# Patient Record
Sex: FEMALE | Race: WHITE | NOT HISPANIC OR LATINO | Employment: FULL TIME | URBAN - METROPOLITAN AREA
[De-identification: names, ages, dates, MRNs, and addresses within clinical notes are randomized per-mention and may not be internally consistent; named-entity substitution may affect disease eponyms.]

---

## 2018-07-27 ENCOUNTER — OFFICE VISIT (OUTPATIENT)
Dept: AUDIOLOGY | Facility: CLINIC | Age: 38
End: 2018-07-27
Payer: OTHER GOVERNMENT

## 2018-07-27 DIAGNOSIS — H90.6 MIXED HEARING LOSS, BILATERAL: Primary | ICD-10-CM

## 2018-07-27 NOTE — PROGRESS NOTES
"AUDIOLOGY REPORT    SUBJECTIVE:  Lisa Alejandro is a 38 year old female who was seen in the Audiology Clinic at the [facility], [facility] and [facility] for audiologic evaluation and BAHA follow-up, referred by her primary care physician Dr. De Dios.  The patient has been seen previously at an outside clinic in December 2011 for assessment and results indicated a bilateral mixed hearing loss. Lisa reports a long history of middle ear surgeries, she reports history of PE tubes, tympanoplasties, and other ear surgeries where she was not sure exactly what was done.  She was implanted with binaural bone anchored hearing aids from [employer] in 2012. The patient reports that her hearing has decreased bilaterally and that she has not had a hearing evaluation since 2011. The patient denies bilateral tinnitus, bilateral otalgia, bilateral drainage, bilateral aural fullness, and dizziness.  The patient notes difficulty with communication in a variety of listening situations without her devices.  She also reports that the right device seems to be \"acting strangely\" since it got knocked off when camping.     OBJECTIVE:  Fall Risk Screen:  1. Have you fallen two or more times in the past year? No  2. Have you fallen and had an injury in the past year? No    Timed Up and Go Score (in seconds): not tested  Is patient a fall risk? No  Referral initiated: No  Fall Risk Assessment Completed by Audiology    Otoscopic exam indicates abnormal anatomy bilaterally.    Pure Tone Thresholds assessed using conventional audiometry with good  reliability from 250-8000 Hz bilaterally using insert earphones and circumaural headphones     RIGHT:  Profound rising to moderately-severe mixed hearing loss    LEFT:    Profound rising to moderately-severe mixed hearing loss  Note: Masking dilemmas and possible overmasking issues for bone conduction thresholds at multiple frequencies.  Could not mask air-conduction thresholds " 250-500 Hz bilaterally due to limits of equipment.    Tympanogram:  Did not test due to unknown surgical history    Reflexes (reported by stimulus ear):  Did not test due to unknown surgical history    Speech Reception Threshold:    RIGHT: 85 dB HL    LEFT:   85 dB HL    Word Recognition Score:     RIGHT: 44% at 100 dB HL using NU-6 recorded word list.    LEFT:   52% at 100 dB HL using NU-6 recorded word list.  Could not test at louder sensation level due to limits of equipment.    Both devices were connected to the computer. In-situ bone conduction was performed and thresholds had slightly changed, the gain prescription was changed based on today's in-situ results.  Patient reported better sound quality with the gain increased.  She reported that the left device was louder than the right device so overall gain in the left device was decreased 5 dB.  Following the decrease in gain the patient reported better balance between the ears.    The right device was cleaned, a listening check did not reveal any distortion.  Lisa reported that the sound quality has been improving since the device got knocked to the ground, she previously had issues a 6-7 times a day with sound quality but know is only having to adjust it once or twice a day.  It was discussed that the hearing aid may have moisture damage if the issue seems to be resolving.  She was instructed to keep it in a stay-dri jar help get any remaining moisture out.  It was reviewed that she could send it in for repair but she would likely have an out of pocket cost for that - insurance will be checked to see if they offer coverage for repairs.       The option of obtaining a new device was also discussed. It was reviewed that she would go through the company for the upgrade.  She expressed understanding.  After reviewing the different options, she would like to see if the repair is covered first before going down the road of obtaining a new device. Options between  Oticon Medical and Cochlear were reviewed, patient has an Android phone and is not interested in an iPhone and so she would likely stay with the [employer] brand should she want a new device.    Lisa had questions regarding accessories that may help her with her landline phone at work.  A review of different accessories was performed - she was also given a [employer] communications booklet.  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      ASSESSMENT:     Compared to patient's previous audiogram dated 2011, air conduction thresholds have significantly decreased and word recognition scores have decreased. Today s results were discussed with the patient in detail.     An osseointegrated implant programming session was performed.    PLAN:  Patient was counseled regarding hearing loss and impact on communication. It is recommended that the patient follow-up with ENT regarding the decreased hearing and because she reports she has not seen an ENT in many years. An insurance check will be performed to see if they cover repairs, once that information is obtained the patient will decide how she would like to proceed.  Please call this clinic with questions regarding these results or recommendations.      Evangelina Freeman, Bang  Audiologist  [place] License  #[id number]

## 2018-07-27 NOTE — MR AVS SNAPSHOT
After Visit Summary   7/27/2018    Lisa Alejandro    MRN: [id number]           Patient Information     Date Of Birth          1980        Visit Information        Provider Department      [facility]        Today's Diagnoses     Mixed hearing loss, bilateral    -  1       Follow-ups after your visit        Who to contact     Please call your clinic at [phone] to:    Ask questions about your health    Make or cancel appointments    Discuss your medicines    Learn about your test results    Speak to your doctor            Additional Information About Your Visit        MyChart Information     [employer] gives you secure access to your electronic health record. If you see a primary care provider, you can also send messages to your care team and make appointments. If you have questions, please call your primary care clinic.  If you do not have a primary care provider, please call [phone] and they will assist you.      [employer] is an electronic gateway that provides easy, online access to your medical records. With [employer], you can request a clinic appointment, read your test results, renew a prescription or communicate with your care team.     To access your existing account, please contact your [facility] or call [phone] for assistance.        Care EveryWhere ID     This is your Care EveryWhere ID. This could be used by other organizations to access your [facility] medical records  [id number]         Blood Pressure from Last 3 Encounters:   04/03/12 120/62    Weight from Last 3 Encounters:   04/03/12 74.3 kg (163 lb 12.8 oz)              We Performed the Following     AUDIOGRAM/TYMPANOGRAM - INTERFACE     BAHA - Fit or F/U (01112)     Cmprhn Audiometry Thrshld Eval & Speech Recog (89307)        Primary Care Provider Office Phone # Fax #    Joanne De Dios [phone]       [facility]  [address] [place] [address]        Equal Access to Services     KERON MENDES : Hadii aad ku hadjose manuellarissa Southali, wajeanieda joliedaleha, marcota marielnurisbhargavi bryansanchezbhargavi, lea connor hunternola bustostaviasupriya enrique. So [facility] [phone].    ATENCIÓN: Si habla español, tiene a pearson disposición servicios gratuitos de asistencia lingüística. Llame al [phone].    We comply with applicable federal civil rights laws and [place] laws. We do not discriminate on the basis of race, color, national origin, age, disability, sex, sexual orientation, or gender identity.            Thank you!     Thank you for choosing [facility] AUDIOLOGY  for your care. Our goal is always to provide you with excellent care. Hearing back from our patients is one way we can continue to improve our services. Please take a few minutes to complete the written survey that you may receive in the mail after your visit with us. Thank you!             Your Updated Medication List - Protect others around you: Learn how to safely use, store and throw away your medicines at www.disposemymeds.org.          This list is accurate as of 7/27/18  3:07 PM.  Always use your most recent med list.                   Brand Name Dispense Instructions for use Diagnosis    ADDERALL XR PO      Take 15 mg by mouth daily.

## 2018-07-29 ENCOUNTER — HEALTH MAINTENANCE LETTER (OUTPATIENT)
Age: 38
End: 2018-07-29

## 2018-07-30 ENCOUNTER — TELEPHONE (OUTPATIENT)
Dept: AUDIOLOGY | Facility: CLINIC | Age: 38
End: 2018-07-30

## 2019-04-18 ENCOUNTER — OFFICE VISIT (OUTPATIENT)
Dept: AUDIOLOGY | Facility: CLINIC | Age: 39
End: 2019-04-18
Payer: COMMERCIAL

## 2019-04-18 DIAGNOSIS — H90.6 MIXED HEARING LOSS, BILATERAL: Primary | ICD-10-CM

## 2019-04-18 NOTE — PROGRESS NOTES
AUDIOLOGY REPORT    SUBJECTIVE: Lisa Alejandro is a 39 year old female who was seen in the Audiology Clinic at the [facility] for a fitting of binaural [employer] Medical Ponto 3 SP osseointegrated devices. Previous results have revealed a bilateral mixed hearing loss. The patient has worn bilateral osseointegrated devices for a number of years and is here today to be fit with upgrades.     OBJECTIVE:   Inspection of the abutment sites revealed no drainage, redness, or swelling.  The abutments were also screwed in tightly. Patient denies any concerns regarding the abutment sites.  The devices were placed and a feedback test was performed. In-Situ bone conduction audiometry was performed. Following the initial programming, Lisa reported that the left device was louder than the right device. The gain in the right device was increased 6 dB until the patient reported good balance between the ears.  A second program for noise was set in the devices.  The volume control and mute function was also activated.  Lisa was oriented to proper use, care, cleaning (no water, dry brush), batteries (size 675, insertion/removal, toxicity, low-battery signal), aid insertion/removal, user booklet, warranty information, storage cases, and other hearing aid details. The patient confirmed understanding of device use and care, and showed proper insertion of devices and batteries while in the office today. Lisa reported good volume and sound quality today.   Hearing aids were programmed as follows:  Program 1:Universal  Program 2: Noise    EAR(S) FIT: Bilateral  MODEL NAME: [employer] Medical Ponto 3 SP  SERIAL NUMBERS: Right: [id number] Left:: [id number]  WARRANTY END DATE: 7/8/2021  The hearing devices were paired with the [employer] Streamer. The streamer was also paired with her Android phone. A review of how the system works was performed and she expressed understanding.    ASSESSMENT: Bilateral osseointegrated devices  were fit today. Verification (in-situ bone conduction) measures were performed. Lisa was provided the details and user manuals regarding her devices.    PLAN: Lisa will return as needed for follow-up. It is recommended that she continue to monitor hearing status every 1-2 years, sooner if concerns. Please call this clinic with questions regarding today s appointment.    Bang Jj  Audiologist  [place] License  #[id number]

## 2019-07-25 ENCOUNTER — TELEPHONE (OUTPATIENT)
Dept: AUDIOLOGY | Facility: CLINIC | Age: 39
End: 2019-07-25

## 2019-07-25 NOTE — TELEPHONE ENCOUNTER
Received email from patient stating that she received the [employer] Ponto 4 upgraded device (they have a current promotion for those recently fit with the Ponto 3 SP devices) wondering when she could come have it programmed.    See reply below:  Yvonne Gonzalez,    I apologize for the delayed response.  Unfortunately, we do not have the ability to program these devices yet - in all honesty, the company has not said anything to us about the upgrades.  I reached out to the [employer]  for my clinic to get some more information.  After speaking with them, I do need to make you aware that you are not an ideal candidate for the recently release Ponto 4 device. You are currently in a Super Power device and they have not released a Super Power device of the Ponto 4.  We can try fitting the Ponto 4 but it will likely not be powerful enough as your hearing thresholds are outside of the recommended fitting range and so unfortunately we made need to keep you in the Ponto 3 SP device (which would be my recommendation).    If you wanted to try fitting the new device, we can try but again, your hearing thresholds are outside of the recommended fitting range and so switching to this less powerful device is not recommended from an audiological standpoint.  I apologize for being the bearer of bad new regarding this.      Again, we can try the Ponto 4 device if you feel strongly. Let me know and I can send a message to my schedulers to reach out to you.  I need to discuss with my  s rep and our assistant when they think we will have the software available and ready to be used.     Let me know what questions you have or how you would like to proceed.    Regards

## 2019-08-09 ENCOUNTER — OFFICE VISIT (OUTPATIENT)
Dept: AUDIOLOGY | Facility: CLINIC | Age: 39
End: 2019-08-09
Payer: COMMERCIAL

## 2019-08-09 DIAGNOSIS — H90.6 MIXED HEARING LOSS, BILATERAL: Primary | ICD-10-CM

## 2019-08-09 NOTE — PROGRESS NOTES
"AUDIOLOGY REPORT     SUBJECTIVE: Lisa Alejandro is a 39 year old female who was seen in the Audiology Clinic at the [facility] on 8/9/2019 for a fitting of binaural [employer] Ponto 4 osseointegrated devices. Previous results have revealed a bilateral mixed hearing loss. The patient has worn bilateral osseointegrated devices for a number of years and is here today to be fit with upgrades that she obtained through the company.     OBJECTIVE:   Inspection of the abutment sites revealed no drainage, redness, or swelling.  The abutments were also screwed in tightly. Patient denies any concerns regarding the abutment sites.    The devices were placed and a feedback test was performed. In-Situ bone conduction audiometry was performed. Following the initial programming, Lisa reported balance between the ears but noted an \"echo\" both when she spoke and when others spoke.  Low frequency gain was reduced about 5 dB until the echo was no longer noticeable.    A second program was attempted to be added with full-directional microphones and increased noise reduction features to try. However, she noted a \"hum\" with that second program, and following troubleshooting it was determined that the \"hum\" was being caused by the directional microphone setting. The second program only has the increased noise reduction features because of this.    Lisa was oriented to proper use, care, cleaning (no water, dry brush), batteries (size 312, insertion/removal, toxicity, low-battery signal), aid insertion/removal, user booklet, warranty information, storage cases, and other hearing aid details. The patient confirmed understanding of device use and care, and showed proper insertion of devices and batteries while in the office today. Lisa reported good volume and sound quality today.     Hearing aids were programmed as follows:  Program 1:Universal  Program 2: Noise     EAR(S) FIT: Bilateral  MODEL NAME: " [employer] Ponto 4  SERIAL NUMBERS: Right: [id number] Left:: [id number]  WARRANTY END DATE: 7/8/2021    The hearing devices were paired with the [employer] connect clip. The connect clip was also paired with her Android phone. A review of how the system works was performed and she expressed understanding. A demo of everything in office revealed that the devices with the accessory were functioning appropriately.     ASSESSMENT: Bilateral osseointegrated devices were fit today. Verification (in-situ bone conduction) measures were performed. Lisa was provided the details and user manuals regarding her devices.     PLAN: Lisa will return as needed for follow-up. It is recommended that she continue to monitor hearing status every 1-2 years, sooner if concerns. Please call this clinic with questions regarding today s appointment.        Bang Jj  Audiologist  [place] License  #[id number]

## 2019-09-18 ENCOUNTER — TELEPHONE (OUTPATIENT)
Dept: AUDIOLOGY | Facility: CLINIC | Age: 39
End: 2019-09-18

## 2019-09-18 NOTE — TELEPHONE ENCOUNTER
Recent email message with reply below      From: Cecy Brooksbridget [[email or web address]]   Sent: Wednesday, September 18, 2019 10:57 AM  To: Evangelina Tinsley  Subject: streamer    I'm wondering how the streamer hooks to my work phone?? my old streamer has a aux hole to plug into this one only has the charging port. Also update on the batteries they suck I get 5 days per processor the old ones I would get 7-10.            Unfortunately there is no aux hole and so the new streamer only connects to things via Bluetooth.    And it is understandable that the battery life is not as good as the battery is a size smaller (size 13), it does not have the same power capabilities as the previous size 675 battery.  If we make a special request we may still be able to return it and get the previous device back.    Just an FYI - I am going to be gone on maternity leave here very shortly, so I am going to direct you to the main clinic line should you need assistance with anything as my email will not be checked.      Please contact [phone] should you need anything with the device or decide to see if we can change it back.    Regards,    Lissette Jj  Audiologist     [facility] [place]  Audiology Clinic  4th Floor, Mail Code [id number]  [address] [place] [address]  [email or web address]

## 2019-11-04 ENCOUNTER — HEALTH MAINTENANCE LETTER (OUTPATIENT)
Age: 39
End: 2019-11-04

## 2020-11-22 ENCOUNTER — HEALTH MAINTENANCE LETTER (OUTPATIENT)
Age: 40
End: 2020-11-22

## 2021-09-19 ENCOUNTER — HEALTH MAINTENANCE LETTER (OUTPATIENT)
Age: 41
End: 2021-09-19

## 2022-01-08 ENCOUNTER — HEALTH MAINTENANCE LETTER (OUTPATIENT)
Age: 42
End: 2022-01-08

## 2022-11-20 ENCOUNTER — HEALTH MAINTENANCE LETTER (OUTPATIENT)
Age: 42
End: 2022-11-20

## 2023-03-01 ENCOUNTER — OFFICE VISIT (OUTPATIENT)
Dept: AUDIOLOGY | Facility: CLINIC | Age: 43
End: 2023-03-01
Payer: COMMERCIAL

## 2023-03-01 DIAGNOSIS — H90.6 MIXED HEARING LOSS, BILATERAL: Primary | ICD-10-CM

## 2023-03-01 PROCEDURE — 92700 UNLISTED ORL SERVICE/PX: CPT | Performed by: AUDIOLOGIST

## 2023-03-01 NOTE — PROGRESS NOTES
AUDIOLOGY REPORT    SUBJECTIVE: Lisa Alejandro is a 43 year old female who was seen in the Audiology Clinic at the [facility] for a fitting of bilateral [employer] Medical Ponto 5 SP devices. Previous results have revealed a bilateral mixed hearing loss. She is a longstanding user of bilateral O[employer]on medical osseointegrated devices on abutments. She was accompanied to today's appointment by her . She has no concerns regarding her abutments at this time.     OBJECTIVE:  In-situ bone conduction was performed and the devices were programed off those results. The patient reported that things were a little too loud and so overall gain was decreased 3 dB bilaterally and she reported good sound quality and volume.   Lisa was oriented to proper hearing aid use, care, cleaning (no water, dry brush), batteries (size: BATTERY SIZE: 675, insertion/removal, toxicity, low-battery signal), aid insertion/removal, user booklet, warranty information, storage cases, and other hearing aid details. The patient confirmed understanding of hearing aid use and care, and showed proper insertion of hearing aid and batteries while in the office today  Hearing aids were programmed as follows:  Program 1:General  Program 2: Speech in Noise  Volume toggle is active.  Push button is active for program change and mute function.     EAR(S) FIT: Bilateral  HEARING AID MODEL NAME: Oticon Medical Ponto 5 SP  HEARING AID STYLE: BAHA  SERIAL NUMBERS: Right: [id number] Left: [id number]  The devices were paired with her Android phone for use with the isael. Because she is an experienced user of the isael she did not need a tutorial of how it functions.  She reports she has all of her accessories at home and felt comfortable pairing the devices.    ASSESSMENT: Bilateral [employer] Medical Ponto 5 SP hearing devices were fit today.     PLAN:Lisa will return for follow-up as neded. Please call this clinic with questions  regarding today s appointment.    Bang Jj  Audiologist  [place] License  #[id number]

## 2023-06-02 ENCOUNTER — HEALTH MAINTENANCE LETTER (OUTPATIENT)
Age: 43
End: 2023-06-02

## 2023-08-22 ENCOUNTER — MEDICAL CORRESPONDENCE (OUTPATIENT)
Dept: HEALTH INFORMATION MANAGEMENT | Facility: CLINIC | Age: 43
End: 2023-08-22
Payer: COMMERCIAL

## 2023-12-19 NOTE — TELEPHONE ENCOUNTER
Left VM letting her know there was no insurance benefit for a repair found so it would be an out of pocket cost to her but it could be repaired.  She was given the option to pursue an upgrade and I would forward the paperwork to her for her to work with the company for the approval for the upgrade.  Asked for a call back to let the clinic know how she would like to proceed.    Bang Jj  Audiologist  [place] License  #[id number]     Attending to bill

## 2024-02-03 ENCOUNTER — HEALTH MAINTENANCE LETTER (OUTPATIENT)
Age: 44
End: 2024-02-03

## 2024-06-22 ENCOUNTER — HEALTH MAINTENANCE LETTER (OUTPATIENT)
Age: 44
End: 2024-06-22

## 2025-07-12 ENCOUNTER — HEALTH MAINTENANCE LETTER (OUTPATIENT)
Age: 45
End: 2025-07-12